# Patient Record
Sex: MALE | Race: WHITE | Employment: FULL TIME | URBAN - METROPOLITAN AREA
[De-identification: names, ages, dates, MRNs, and addresses within clinical notes are randomized per-mention and may not be internally consistent; named-entity substitution may affect disease eponyms.]

---

## 2018-01-02 ENCOUNTER — HOSPITAL ENCOUNTER (OUTPATIENT)
Dept: ULTRASOUND IMAGING | Age: 58
Discharge: HOME | End: 2018-01-02
Attending: INTERNAL MEDICINE
Payer: COMMERCIAL

## 2018-01-02 DIAGNOSIS — R79.89 ELEVATED SERUM CREATININE: ICD-10-CM

## 2018-01-02 PROCEDURE — 76770 US EXAM ABDO BACK WALL COMP: CPT

## 2023-07-26 RX ORDER — CARVEDILOL 6.25 MG/1
6.25 TABLET ORAL 2 TIMES DAILY WITH MEALS
Qty: 180 TABLET | Refills: 0 | Status: SHIPPED | OUTPATIENT
Start: 2023-07-26

## 2023-07-26 RX ORDER — AMLODIPINE BESYLATE 10 MG/1
10 TABLET ORAL DAILY
Qty: 90 TABLET | Refills: 0 | Status: SHIPPED | OUTPATIENT
Start: 2023-07-26

## 2023-07-26 RX ORDER — ATORVASTATIN CALCIUM 40 MG/1
40 TABLET, FILM COATED ORAL DAILY
Qty: 90 TABLET | Refills: 0 | Status: SHIPPED | OUTPATIENT
Start: 2023-07-26

## 2023-07-26 NOTE — TELEPHONE ENCOUNTER
Patient called stating he would like the following prescriptions refilled :    amLODIPine (NORVASC) 10 MG tablet  # 90 Day Supply    carvedilol (COREG) 6.25 MG tablet  #90 Day Supply    atorvastatin (LIPITOR) 40 MG tablet  #90 Day Supply      Pharmacy    [address], [address] [place] [address]    [phone]

## 2023-07-26 NOTE — TELEPHONE ENCOUNTER
Requested Prescriptions     Pending Prescriptions Disp Refills    carvedilol (COREG) 6.25 MG tablet 180 tablet 0     Sig: Take 1 tablet by mouth 2 times daily (with meals) TAKE 1 TABLET BY MOUTH TWO (2) TIMES DAILY (WITH MEALS).     atorvastatin (LIPITOR) 40 MG tablet 90 tablet 0     Sig: Take 1 tablet by mouth daily    amLODIPine (NORVASC) 10 MG tablet 90 tablet 0     Sig: Take 1 tablet by mouth daily

## 2023-08-23 ENCOUNTER — OFFICE VISIT (OUTPATIENT)
Age: 63
End: 2023-08-23
Payer: COMMERCIAL

## 2023-08-23 VITALS
HEIGHT: 70 IN | WEIGHT: 194 LBS | BODY MASS INDEX: 27.77 KG/M2 | HEART RATE: 64 BPM | DIASTOLIC BLOOD PRESSURE: 88 MMHG | SYSTOLIC BLOOD PRESSURE: 138 MMHG

## 2023-08-23 DIAGNOSIS — I25.10 ATHEROSCLEROSIS OF NATIVE CORONARY ARTERY OF NATIVE HEART WITHOUT ANGINA PECTORIS: Primary | ICD-10-CM

## 2023-08-23 DIAGNOSIS — E78.2 MIXED HYPERLIPIDEMIA: ICD-10-CM

## 2023-08-23 DIAGNOSIS — I10 ESSENTIAL (PRIMARY) HYPERTENSION: ICD-10-CM

## 2023-08-23 PROCEDURE — 3075F SYST BP GE 130 - 139MM HG: CPT | Performed by: INTERNAL MEDICINE

## 2023-08-23 PROCEDURE — 3079F DIAST BP 80-89 MM HG: CPT | Performed by: INTERNAL MEDICINE

## 2023-08-23 PROCEDURE — 93000 ELECTROCARDIOGRAM COMPLETE: CPT | Performed by: INTERNAL MEDICINE

## 2023-08-23 PROCEDURE — 99214 OFFICE O/P EST MOD 30 MIN: CPT | Performed by: INTERNAL MEDICINE

## 2023-08-23 RX ORDER — AMLODIPINE BESYLATE 10 MG/1
10 TABLET ORAL DAILY
Qty: 90 TABLET | Refills: 3 | Status: SHIPPED | OUTPATIENT
Start: 2023-08-23

## 2023-08-23 RX ORDER — ATORVASTATIN CALCIUM 40 MG/1
40 TABLET, FILM COATED ORAL DAILY
Qty: 90 TABLET | Refills: 3 | Status: SHIPPED | OUTPATIENT
Start: 2023-08-23

## 2023-08-23 RX ORDER — NITROGLYCERIN 0.4 MG/1
0.4 TABLET SUBLINGUAL EVERY 5 MIN PRN
Qty: 25 TABLET | Refills: 1 | Status: SHIPPED | OUTPATIENT
Start: 2023-08-23

## 2023-08-23 RX ORDER — CARVEDILOL 6.25 MG/1
6.25 TABLET ORAL 2 TIMES DAILY WITH MEALS
Qty: 180 TABLET | Refills: 3 | Status: SHIPPED | OUTPATIENT
Start: 2023-08-23

## 2023-08-23 ASSESSMENT — ENCOUNTER SYMPTOMS
HEMOPTYSIS: 0
BACK PAIN: 0
BLURRED VISION: 0
ORTHOPNEA: 0
VOMITING: 0
DOUBLE VISION: 0
NAUSEA: 0
ABDOMINAL PAIN: 0
COUGH: 0
SHORTNESS OF BREATH: 0
BLOATING: 0

## 2023-08-23 NOTE — PROGRESS NOTES
[facility] CARDIOLOGY  [address] [address]  PHONE: [phone]    23    NAME:  Keerthi Kamara  : 1960  MRN: [id number]         SUBJECTIVE:   Keerthi Kamara is a 61 y.o. male seen for a visit regarding the following:     Chief Complaint   Patient presents with    Coronary Artery Disease    Hypertension       HPI:      Prior history of coronary artery disease [prior PCI to proximal to mid LAD/distal RCA-; some residual about 50% stenosis of an obtuse marginal branch]. Other history of hypertension, hyperlipidemia. Echo from 2022 with preserved EF and mild aortic regurgitation. Doing well since his last visit. No chest discomfort. Active at baseline with no issues; walks ~2 miles/day. Well-controlled home blood pressures and less than 130/80. Prior---Some issues with CP-dull ache initially noted a week ago; noted when he woke up and improved with doing stuff. Also some vague left arm soreness worse when he moves his arm. No increased dyspnea on exertion. States the dull ache is noted currently as well and can last for hours. Still active at baseline and can walk a few miles with no issues. Prior---Walks over 2 miles/day with no issues. No CP/SOB. Prior anginal sx-'heart burn'/SOB. Home BPs mostly well-controlled. Prior anginal sx-chest pain/heart burn with inclines     CAD-controlled, HTN-controlled, HLP-controlled, chest pain-controlled       Past Medical History, Past Surgical History, Family history, Social History, and Medications were all reviewed with the patient today and updated as necessary.      Allergies   Allergen Reactions    Codeine Itching     Patient Active Problem List   Diagnosis    Coronary artery disease involving native coronary artery without angina pectoris    Essential hypertension    Chronic back pain    Hyperglycemia    Therapeutic drug monitoring    HLD (hyperlipidemia)     Past Medical History:   Diagnosis

## 2023-09-05 RX ORDER — NITROGLYCERIN 0.4 MG/1
0.4 TABLET SUBLINGUAL EVERY 5 MIN PRN
Qty: 25 TABLET | Refills: 3 | Status: SHIPPED | OUTPATIENT
Start: 2023-09-05

## 2023-09-05 NOTE — TELEPHONE ENCOUNTER
[facility] home delivery needs a 90 day supply for the NitroStat due to her Insurance.  Please call to let them know if that  is ok

## 2024-04-24 RX ORDER — NITROGLYCERIN 0.4 MG/1
0.4 TABLET SUBLINGUAL EVERY 5 MIN PRN
Qty: 100 TABLET | Refills: 4 | Status: SHIPPED | OUTPATIENT
Start: 2024-04-24

## 2024-10-15 ENCOUNTER — OFFICE VISIT (OUTPATIENT)
Age: 64
End: 2024-10-15
Payer: COMMERCIAL

## 2024-10-15 VITALS
HEIGHT: 70 IN | WEIGHT: 206.5 LBS | SYSTOLIC BLOOD PRESSURE: 136 MMHG | DIASTOLIC BLOOD PRESSURE: 78 MMHG | BODY MASS INDEX: 29.56 KG/M2 | HEART RATE: 66 BPM

## 2024-10-15 DIAGNOSIS — E78.2 MIXED HYPERLIPIDEMIA: ICD-10-CM

## 2024-10-15 DIAGNOSIS — I10 ESSENTIAL (PRIMARY) HYPERTENSION: Primary | ICD-10-CM

## 2024-10-15 DIAGNOSIS — I25.10 CORONARY ARTERY DISEASE INVOLVING NATIVE CORONARY ARTERY OF NATIVE HEART WITHOUT ANGINA PECTORIS: ICD-10-CM

## 2024-10-15 PROCEDURE — 3078F DIAST BP <80 MM HG: CPT | Performed by: INTERNAL MEDICINE

## 2024-10-15 PROCEDURE — 3075F SYST BP GE 130 - 139MM HG: CPT | Performed by: INTERNAL MEDICINE

## 2024-10-15 PROCEDURE — 93000 ELECTROCARDIOGRAM COMPLETE: CPT | Performed by: INTERNAL MEDICINE

## 2024-10-15 PROCEDURE — 99214 OFFICE O/P EST MOD 30 MIN: CPT | Performed by: INTERNAL MEDICINE

## 2024-10-15 RX ORDER — AMLODIPINE BESYLATE 10 MG/1
10 TABLET ORAL DAILY
Qty: 90 TABLET | Refills: 3 | Status: SHIPPED | OUTPATIENT
Start: 2024-10-15

## 2024-10-15 RX ORDER — CARVEDILOL 6.25 MG/1
6.25 TABLET ORAL 2 TIMES DAILY WITH MEALS
Qty: 180 TABLET | Refills: 3 | Status: SHIPPED | OUTPATIENT
Start: 2024-10-15

## 2024-10-15 RX ORDER — ATORVASTATIN CALCIUM 40 MG/1
40 TABLET, FILM COATED ORAL DAILY
Qty: 90 TABLET | Refills: 3 | Status: SHIPPED | OUTPATIENT
Start: 2024-10-15

## 2024-10-15 ASSESSMENT — ENCOUNTER SYMPTOMS
VOMITING: 0
BLOATING: 0
COUGH: 0
NAUSEA: 0
SHORTNESS OF BREATH: 0
DOUBLE VISION: 0
HEMOPTYSIS: 0
ABDOMINAL PAIN: 0
BLURRED VISION: 0
BACK PAIN: 0
ORTHOPNEA: 0

## 2024-10-15 NOTE — PROGRESS NOTES
hyperlipidemia.    Diagnoses and all orders for this visit:    Essential (primary) hypertension  -     EKG 12 Lead    Mixed hyperlipidemia    Coronary artery disease involving native coronary artery of native heart without angina pectoris    Other orders  -     carvedilol (COREG) 6.25 MG tablet; Take 1 tablet by mouth 2 times daily (with meals) TAKE 1 TABLET BY MOUTH TWO (2) TIMES DAILY (WITH MEALS).  -     amLODIPine (NORVASC) 10 MG tablet; Take 1 tablet by mouth daily  -     atorvastatin (LIPITOR) 40 MG tablet; Take 1 tablet by mouth daily        Overall Impression    Coronary disease-controlled.  Continue aspirin. On a high intensity statin.  Discussed updated dual antiplatelet therapy guidelines.  Also discussed data with regard to stress testing and guideline recommendations/trial data.  No current indication for stress testing. Asymptomatic and active at baseline.  Discussed updated CCD guidelines     Hypertension-guideline update discussed with target less than 130/80. Well-controlled with home reads     Hyperlipidemia-on a high intensity statin.  Updated lipid guidelines discussed.  Last LDL at 58 from 12/2023; prior at 77 [8/3/2021; triglycerides at 113].  At target and continue current dosing    Return in about 9 months (around 7/15/2025).     León Skinner MD  10/15/2024  10:50 AM

## 2024-10-24 ENCOUNTER — TELEPHONE (OUTPATIENT)
Age: 64
End: 2024-10-24

## 2024-10-24 NOTE — TELEPHONE ENCOUNTER
Cardiac Clearance        Physician or Practice Requesting:[facility]  :   Contact Phone Number: [phone]  Fax Number: [phone]  Date of Surgery/Procedure: 10/31/24  Type of Surgery or Procedure: Extraction of tooth/bone grafting w/ lateral window sinus lift   Type of Anesthesia: General   Type of Clearance Requested: risk assessment and any medication hold   Medication to Hold:?  Days to Hold: ?

## 2024-10-24 NOTE — TELEPHONE ENCOUNTER
Per Dr. Skinner, \"No further cardiac workup needed with planned dental procedure.  Continue aspirin\".

## 2024-10-28 ENCOUNTER — TELEPHONE (OUTPATIENT)
Age: 64
End: 2024-10-28

## 2024-10-28 NOTE — TELEPHONE ENCOUNTER
Carleen with [facility] called stating she needs the following :    Most recent EKG      Please call / fax and advise.      Fax # [phone]

## 2025-04-14 ENCOUNTER — TELEPHONE (OUTPATIENT)
Age: 65
End: 2025-04-14

## 2025-04-14 NOTE — TELEPHONE ENCOUNTER
Cardiac Clearance        Physician or Practice Requesting:[facility]  : Dr.Diane Martinez   Contact Phone Number: [phone]  Fax Number: [phone]  Date of Surgery/Procedure: 05/28/25  Type of Surgery or Procedure: Implant Placement  #14   Type of Anesthesia: General  Type of Clearance Requested: risk assessment and any medication hold   Medication to Hold:?  Days to Hold: ?

## 2025-04-15 NOTE — TELEPHONE ENCOUNTER
Per Dr. Skinner,   No further workup needed.  Continue aspirin perioperatively     Clearance faxed

## 2025-05-13 ENCOUNTER — TELEPHONE (OUTPATIENT)
Age: 65
End: 2025-05-13

## 2025-05-13 NOTE — TELEPHONE ENCOUNTER
Per Dr. Skinner,   Not sure what they are looking for.  Dental procedures are low risk procedures and he is on aspirin which should never be held in the setting of coronary disease.

## 2025-05-13 NOTE — TELEPHONE ENCOUNTER
Cardiac Clearance        Physician or Practice Requesting:[facility]  : Carleen  Contact Phone Number: [phone]     Fax Number: [phone]  Date of Surgery/Procedure: 5/28/25  Type of Surgery or Procedure: Implant placement #4 tooth  Type of Anesthesia: IV sedation  Type of Clearance Requested: Cardiac Clearance and Medication Hold  Medication to Hold:If pt is on blood thinners  Days to Hold: cardiologist preference    NOTE:  Please send copy of most recent EKG